# Patient Record
Sex: FEMALE | Race: ASIAN | Employment: STUDENT | URBAN - METROPOLITAN AREA
[De-identification: names, ages, dates, MRNs, and addresses within clinical notes are randomized per-mention and may not be internally consistent; named-entity substitution may affect disease eponyms.]

---

## 2018-07-13 ENCOUNTER — HOSPITAL ENCOUNTER (EMERGENCY)
Facility: CLINIC | Age: 18
Discharge: OTHER INSTITUTION | End: 2018-07-14
Attending: EMERGENCY MEDICINE | Admitting: EMERGENCY MEDICINE
Payer: COMMERCIAL

## 2018-07-13 ENCOUNTER — TRANSFERRED RECORDS (OUTPATIENT)
Dept: HEALTH INFORMATION MANAGEMENT | Facility: CLINIC | Age: 18
End: 2018-07-13

## 2018-07-13 ENCOUNTER — MEDICAL CORRESPONDENCE (OUTPATIENT)
Dept: HEALTH INFORMATION MANAGEMENT | Facility: CLINIC | Age: 18
End: 2018-07-13

## 2018-07-13 DIAGNOSIS — R45.851 SUICIDAL IDEATION: ICD-10-CM

## 2018-07-13 DIAGNOSIS — F12.90 MARIJUANA USE: ICD-10-CM

## 2018-07-13 LAB
AMPHETAMINES UR QL SCN: NEGATIVE
BARBITURATES UR QL: NEGATIVE
BENZODIAZ UR QL: NEGATIVE
CANNABINOIDS UR QL SCN: POSITIVE
COCAINE UR QL: NEGATIVE
OPIATES UR QL SCN: NEGATIVE
PCP UR QL SCN: NEGATIVE

## 2018-07-13 PROCEDURE — 99285 EMERGENCY DEPT VISIT HI MDM: CPT | Mod: 25

## 2018-07-13 PROCEDURE — 90791 PSYCH DIAGNOSTIC EVALUATION: CPT

## 2018-07-13 PROCEDURE — 80307 DRUG TEST PRSMV CHEM ANLYZR: CPT | Performed by: EMERGENCY MEDICINE

## 2018-07-13 PROCEDURE — 99284 EMERGENCY DEPT VISIT MOD MDM: CPT | Mod: 25

## 2018-07-13 PROCEDURE — 25000132 ZZH RX MED GY IP 250 OP 250 PS 637: Performed by: EMERGENCY MEDICINE

## 2018-07-13 RX ORDER — LORAZEPAM 0.5 MG/1
0.5 TABLET ORAL ONCE
Status: COMPLETED | OUTPATIENT
Start: 2018-07-13 | End: 2018-07-13

## 2018-07-13 RX ADMIN — LORAZEPAM 0.5 MG: 0.5 TABLET ORAL at 19:29

## 2018-07-13 NOTE — ED NOTES
Bed: [facility]  Expected date:   Expected time:   Means of arrival:   Comments:  brooke - Andre  Psych eval. Eta [id number]

## 2018-07-13 NOTE — ED PROVIDER NOTES
History     Chief Complaint:  Suicidal    HPI   Chanda Gongora is a 17 year old female, with a history of depression and bipolar disorder, who presents to the ED for evaluation of suicidal. Per nurse's report, the patient saw her therapist today and made suicidal comments. She has been scratching her arms to the point of bleeding. The patient stated she had been suicidal since 2-3 days ago, but then stated later she did not mean it. Upon evaluation, the patient reports she drover herself to see her regular therapist today. They were discussing about her hospitalization x2 within the past 6 months. They also discussed about her bipolar diagnosis and her current benedicto. Her therapist subsequently called police to transport her to the ED. The patient notes she has not been eating, drinking, or sleeping over last week. She has been hiding herself in her room to avoid her parents who does not know about her current mental health. The patient reports she stopped taking her Lamictal, Wellbutrin, Celexa, and Ritalin last week because she felt like it. She did smoke marijuana yesterday and today with her last alcohol intake 2 nights ago. She last smoked marijuana 4-5 hours ago. She has not used cocaine in 3 months since her last hospitalization. The patient denies making suicidal comments to her therapist or current suicidal ideation. She has not had recent medication changes. The patient notes she would like to go home.      Allergies:  No known drug allergies    Medications:    Celexa  Wellbutrin  Lipitor  Lamictal   Ritalin     Past Medical History:    ADHD  HLD  Depression  Bipolar disorder     Past Surgical History:    Cosmetic surgery    Family History:    History reviewed. No pertinent family history.     Social History:  Smoking status: No  Alcohol use: Yes  Presents to ED alone    Marital Status: Single [1]     Review of Systems   Unable to perform ROS: Psychiatric disorder     Physical Exam     Patient Vitals for  "the past 24 hrs:   BP Temp Temp src Pulse Heart Rate Resp SpO2 Height Weight   07/13/18 1643 122/65 98.5  F (36.9  C) Oral - 117 20 99 % - -   07/13/18 1638 122/65 - - - 117 - 100 % - -   07/13/18 1457 125/75 98.7  F (37.1  C) Oral 147 - 20 100 % 1.6 m (5' 3\") 47.6 kg (105 lb)     Physical Exam  General: Resting comfortably on the gurney  Eyes:  The pupils are equal and round    Conjunctivae and sclerae are normal  ENT:    Moist mucous membranes  Neck:  Normal range of motion  CV:  Tachycardic rate and rhythm    Skin warm and well perfused   Resp:  Lungs are clear    Non-labored    No rales    No wheezing  MS:  Normal muscular tone  Skin:  Superficial abrasions on arms  Neuro:   Awake, alert.      Speech is normal and fluent.    Face is symmetric.     Moves all extremities equally  Psych: Decreased eye contact, appears anxious.  Appropriate interactions.    Emergency Department Course     Laboratory:  UDS: Cannabinoids positive, o/w Negative     Emergency Department Course:  Patient arrived by EMS.     Past medical records, nursing notes, and vitals reviewed.  1541: I performed an exam of the patient and obtained history, as documented above.    1551: I spoke with Anthony BROOKE.     Patient provided a urine sample.    1635: I spoke with Anthony after his evaluation of the patient in the ED.     1751: I rechecked the patient. Explained plan to patient.    Findings and plan explained to the Patient. Patient will be transferred to [facility] via EMS. Dr. Leigh will admit the patient to a monitored bed for further monitoring, evaluation, and treatment.     Impression & Plan      Medical Decision Making:  Chanda Gongora is a 17 year old female presented emergency department with suicidal thoughts.  Patient saw her therapist today who transferred her to the emergency department.  She apparently needed to be placed in soft restraints on transfer here because of her agitation.  She was calm and cooperative here in the emergency " department.  Patient has been off her medications for the last week though does not give a good reason why.  She is denying any suicidal thoughts here in the emergency department but apparently had made comments to her therapist about suicidal thoughts.  Her therapist was concerned enough to send her to the emergency department.  Patient will not let us talk to her parents .  Unable to facilitate safe discharge plan given she will not let us talk to parents, she has been off medications for the last week, superficial cutting. Placed on hold and will be transferred to [facility].     Diagnosis:    ICD-10-CM   1. Suicidal ideation R45.851   2. Marijuana use F12.90     Disposition: Patient transferred to [facility] via EMS to be admitted to bed by Dr. Leigh.     Veronica Seo  7/13/2018    EMERGENCY DEPARTMENT    Scribe Disclosure:  I, Veronica Seo, am serving as a scribe at 3:41 PM on 7/13/2018 to document services personally performed by Linda Stephenson MD based on my observations and the provider's statements to me.          Linda Stephenson MD  07/13/18 [id number]

## 2018-07-14 ENCOUNTER — HOSPITAL ENCOUNTER (INPATIENT)
Facility: CLINIC | Age: 18
LOS: 3 days | Discharge: HOME | DRG: 881 | End: 2018-07-17
Attending: PSYCHIATRY & NEUROLOGY | Admitting: PSYCHIATRY & NEUROLOGY
Payer: COMMERCIAL

## 2018-07-14 VITALS
HEIGHT: 63 IN | SYSTOLIC BLOOD PRESSURE: 126 MMHG | RESPIRATION RATE: 14 BRPM | DIASTOLIC BLOOD PRESSURE: 84 MMHG | BODY MASS INDEX: 18.61 KG/M2 | TEMPERATURE: 98.5 F | OXYGEN SATURATION: 100 % | HEART RATE: 116 BPM | WEIGHT: 105 LBS

## 2018-07-14 DIAGNOSIS — F32.9 REACTIVE DEPRESSION: Primary | ICD-10-CM

## 2018-07-14 DIAGNOSIS — F90.8 ATTENTION DEFICIT HYPERACTIVITY DISORDER (ADHD), OTHER TYPE: ICD-10-CM

## 2018-07-14 PROBLEM — R45.851 SUICIDAL IDEATION: Status: ACTIVE | Noted: 2018-07-14

## 2018-07-14 PROCEDURE — 99207 ZZC CDG-MDM COMPONENT: MEETS MODERATE - DOWN CODED: CPT | Performed by: PSYCHIATRY & NEUROLOGY

## 2018-07-14 PROCEDURE — 99222 1ST HOSP IP/OBS MODERATE 55: CPT | Mod: AI | Performed by: PSYCHIATRY & NEUROLOGY

## 2018-07-14 PROCEDURE — 12400007 ZZH R&B MH INTERMEDIATE UMMC

## 2018-07-14 PROCEDURE — 25000132 ZZH RX MED GY IP 250 OP 250 PS 637: Performed by: PSYCHIATRY & NEUROLOGY

## 2018-07-14 RX ORDER — TRAZODONE HYDROCHLORIDE 50 MG/1
50 TABLET, FILM COATED ORAL
Status: DISCONTINUED | OUTPATIENT
Start: 2018-07-14 | End: 2018-07-17 | Stop reason: HOSPADM

## 2018-07-14 RX ORDER — HYDROXYZINE HYDROCHLORIDE 25 MG/1
25 TABLET, FILM COATED ORAL EVERY 4 HOURS PRN
Status: DISCONTINUED | OUTPATIENT
Start: 2018-07-14 | End: 2018-07-14

## 2018-07-14 RX ORDER — ALUMINA, MAGNESIA, AND SIMETHICONE 2400; 2400; 240 MG/30ML; MG/30ML; MG/30ML
30 SUSPENSION ORAL EVERY 4 HOURS PRN
Status: DISCONTINUED | OUTPATIENT
Start: 2018-07-14 | End: 2018-07-17 | Stop reason: HOSPADM

## 2018-07-14 RX ORDER — LAMOTRIGINE 25 MG/1
25 TABLET ORAL DAILY
Status: DISCONTINUED | OUTPATIENT
Start: 2018-07-14 | End: 2018-07-17 | Stop reason: HOSPADM

## 2018-07-14 RX ORDER — HYDROXYZINE HYDROCHLORIDE 25 MG/1
25 TABLET, FILM COATED ORAL 2 TIMES DAILY PRN
Status: DISCONTINUED | OUTPATIENT
Start: 2018-07-14 | End: 2018-07-17 | Stop reason: HOSPADM

## 2018-07-14 RX ORDER — ACETAMINOPHEN 325 MG/1
650 TABLET ORAL EVERY 4 HOURS PRN
Status: DISCONTINUED | OUTPATIENT
Start: 2018-07-14 | End: 2018-07-17 | Stop reason: HOSPADM

## 2018-07-14 RX ORDER — CITALOPRAM HYDROBROMIDE 10 MG/1
10 TABLET ORAL DAILY
Status: DISCONTINUED | OUTPATIENT
Start: 2018-07-14 | End: 2018-07-16

## 2018-07-14 RX ORDER — BISACODYL 10 MG
10 SUPPOSITORY, RECTAL RECTAL DAILY PRN
Status: DISCONTINUED | OUTPATIENT
Start: 2018-07-14 | End: 2018-07-17 | Stop reason: HOSPADM

## 2018-07-14 RX ADMIN — LAMOTRIGINE 25 MG: 25 TABLET ORAL at 16:42

## 2018-07-14 RX ADMIN — CITALOPRAM HYDROBROMIDE 10 MG: 10 TABLET ORAL at 16:42

## 2018-07-14 RX ADMIN — TRAZODONE HYDROCHLORIDE 50 MG: 50 TABLET ORAL at 21:45

## 2018-07-14 ASSESSMENT — ACTIVITIES OF DAILY LIVING (ADL)
ORAL_HYGIENE: INDEPENDENT
DRESS: INDEPENDENT
DRESS: INDEPENDENT
GROOMING: INDEPENDENT
ORAL_HYGIENE: INDEPENDENT
GROOMING: INDEPENDENT

## 2018-07-14 NOTE — PLAN OF CARE
"Problem: Mood Impairment (Depressive Signs/Symptoms) (Adult)  Goal: Improved Mood Symptoms (Depressive Signs/Symptoms)    Pt admitted to station 4A on a 72-hour hold from [facility] ED for suicidal ideation and reported benedicto. Hold paperwork is in pt's chart. Per report, pt has a history of depression, bipolar disorder, and ADHD. Pt reportedly went to see her therapist yesterday and told her therapist she is manic, scratching herself, and that she wanted to die. Pt reported that she has not been eating, drinking, or sleeping well. Pt's therapist called 911 and pt was transported to [facility] ED. Pt reportedly became combative while being transported and was placed in restraints. Per nurse at [facility], pt had been tachycardic and was given Ativan 0.5 mg PO in the ED. Upon arrival to the unit, pt was searched by two female staff. Pt was cooperative with the search, vitals, and the admission interview. When asked why she is here, pt stated, \"My therapist called the police because I was very very out of control and manic when I saw her.\" This is pt's 3rd inpatient mental health hospitalization. Pt has been hospitalized twice in the last six months. The most recent hospitalization was at [facility] in [place] in April 2018. Pt denied any current stressors. Pt denied any history of suicide attempts. Pt does see a therapist and psychiatrist. Pt has a history of SIB via cutting and burning. Pt reported she last engaged in SIB 3 weeks ago, however, intake note indicates pt scratched herself yesterday. Pt reported she has not taken her medications in about a week because she wanted to see how she would feel without them. On-call provider did not order pt's PTA medications, aside from Lipitor. Pt does not appear to be manic to writer, although reports she hasn't slept in 3 days due to benedicto. Pt denied any history of abuse or abusing others. Reported occasional alcohol and marijuana use. Pt's Utox was positive for marijuana. HCG " "was not done in the ED. Pt reported a history of attempted elopement during a previous hospitalization, but stated, \"I was just messing around, but they took it too seriously.\" Pt declined to sign an RAO for anyone at admission. Denied current SI/SIB/HI and contracted for safety. Pt did disclose that she does not like male nurses or psych associates. Will request that pt have female staff only. Status 15 and suicide/SIB/elopement precautions initiated.       "

## 2018-07-14 NOTE — ED NOTES
Report received. Care of patient assumed. Patient lying on cart quietly. Respirations are regular and unlabored. Sitter at bedside. Continue to monitor.

## 2018-07-14 NOTE — PROGRESS NOTES
07/14/18 0114   Patient Belongings   Did you bring any home meds/supplements to the hospital?  No   Patient Belongings cell phone/electronics;clothing;keys   Disposition of Belongings Locker;Sent to security per site process   Belongings Search Yes   Clothing Search Yes  (Yuliet STERLING did clothing search)   Second Staff Yuliet STERLING   Items sent to security in envelope #[id number]: [facility] ID, Visa debit card (estee-dye in color) ending in [phone], [facility] Mastercard debit card ending in [phone], [employer] health insurance card, [place] drivers license    Items in patient's storage bin: red wallet containing receipts, [employer] card, & [employer] card. Cell phone, keys & lanyard, black sports bra, blue t-shirt, maroon shorts w/ strings, black flip flop sandals    Items Added 7/14/18: Face cleanser and cream, leave in conditioner, brush, tweezers, head band, hair ties, 5 pr socks, sweatpants with strings, 9 pr underwear,  4 sports bras, glasses with case, 6 t-shirts, 1 long sleeved shirt, 1 blanket, 1 duffle bag.     Items added 4/15/18: 2 pr shorts with strings, pants with strings, green reusable grocery bag  A               Admission:  I am responsible for any personal items that are not sent to the safe or pharmacy.  [facility] is not responsible for loss, theft or damage of any property in my possession.    Signature:  _________________________________ Date: _______  Time: _____                                              Staff Signature:  ____________________________ Date: ________  Time: _____      2nd Staff person, if patient is unable/unwilling to sign:    Signature: ________________________________ Date: ________  Time: _____     Discharge:  [facility] has returned all of my personal belongings:    Signature: _________________________________ Date: ________  Time: _____                                          Staff Signature:  ____________________________ Date: ________  Time: _____

## 2018-07-14 NOTE — PROGRESS NOTES
"Pt visible in milieu and converses with peers. Pt cooperative with staff requests, but does not phrase her requests as a question, but as a demand. Pt approached staff and said, \"Clothes.\" When asked to clarify, she did ask as a question. Pt attending groups. Pt appears comfortable on the unit.     Pt refuses to check in. Pt refuses to answer questions regarding suicidal ideation. Pt states, \"I'm fine\" and walked away.      07/14/18 1400   Behavioral Health   Hallucinations denies / not responding to hallucinations   Thinking intact   Orientation person: oriented;place: oriented;date: oriented;time: oriented   Memory baseline memory   Judgement impaired   Eye Contact at examiner   Affect full range affect   Mood anxious   Physical Appearance/Attire attire appropriate to age and situation   Hygiene well groomed   Elopement (no concerning behaviors)   Activity other (see comment)  (visible and social in milieu)   Speech coherent;clear   Medication Sensitivity no observed side effects;no stated side effects   Psychomotor / Gait balanced;steady   Safety   Suicidality Status 15   Psycho Education   Type of Intervention 1:1 intervention   Response participates with encouragement   Hours 0.5   Activities of Daily Living   Hygiene/Grooming independent   Oral Hygiene independent   Dress independent   Room Organization independent   Activity   Activity Assistance Provided independent     "

## 2018-07-14 NOTE — PROGRESS NOTES
"Initial Psychosocial Assessment    I have reviewed the chart, met with the patient, and developed Care Plan.      Presenting Problem:  Per patient: Patient was in therapy and said that her therapist called the police, when asked why she replied, \"you'll have to ask her I don't really remember.\" Patient reports she has been experiencing \"benedicto mostly\" fairly frequently the last couple months. Patient reports she has been diagnosed with BESSIE, MDD, ADHD, Bipolar disorder.    Patient brought to ED via EMS after disclosing to her therapist that she was suicidal. Patient also exhibiting erratic behaviors and has been off medications.Chanda Gongora is a 17 year old female, with a history of depression and bipolar disorder, who presents to the ED for evaluation of suicidal. Per nurse's report, the patient saw her therapist today and made suicidal comments. She has been scratching her arms to the point of bleeding. The patient stated she had been suicidal since 2-3 days ago, but then stated later she did not mean it. Upon evaluation, the patient reports she drover herself to see her regular therapist today. They were discussing about her hospitalization x2 within the past 6 months. They also discussed about her bipolar diagnosis and her current benedicto. Her therapist subsequently called police to transport her to the ED. The patient notes she has not been eating, drinking, or sleeping over last week. She has been hiding herself in her room to avoid her parents who does not know about her current mental health. The patient reports she stopped taking her Lamictal, Wellbutrin, Celexa, and Ritalin last week because she felt like it. She did smoke marijuana yesterday and today with her last alcohol intake 2 nights ago. She last smoked marijuana 4-5 hours ago. She has not used cocaine in 3 months since her last hospitalization. The patient denies making suicidal comments to her therapist or current suicidal ideation. She has not had " recent medication changes. The patient notes she would like to go home    History of Mental Health and Chemical Dependency:  Mental health history: Patient has been hospitalized twice this year, [facility] and again in March at [facility]    Chemical use history: Consistent daily cannabis use for last several years. Utox positive for cannbinoids.    Family Description (Constellation, Family Psychiatric History):  Patient was adopted from [place] and has been trying to locate her birth mother. Patient grew up in [place] and was raised by adoptive parents. She has a close relationship with her parents and brother and finds them to be very supportive. She reports her brother was also adopted by their parents.  She has no knowledge of her biological family.    Significant Life Events (Illness, Abuse, Trauma, Death):  Patient endorses bullying in school    Living Situation:  Patient resides with her parents    Educational Background:  Was a freshman in college, dropped out after first hospitalization. Plans to resume college once stable.    Occupational History:  Patient is unemployed    Financial Status:  Supported by [facility]    Legal Issues:  Patient denies     Ethnic/Cultural Issues:   / English speaking    Spiritual Orientation:  None identified     Service History:  Denies     Current Treatment Providers are:  Psychiatry: Dr. Milka Zavaleta  Therapist: Feliberto Rodriguez PsyD LP  PCP: Dr. Carolina Be at [facility] in [place]    Social Functioning:  Patient reports that she likes to hang out by herself. She reports she hangs out with her dog and sometimes her best friend. She reports she has a drivers license and a car. Reports that she watches television during the day. She enjoys listening and playing music, she reports that she plays guitar. She likes watching movies and going on boat rides.    Social Service Assessment/Plan:  Patient has been admitted for psychiatric stabilization. Patient will have  psychiatric assessment and medication management by the psychiatrist. Medications will be reviewed and adjusted per MD as indicated. The treatment team will continue to assess and stabilize the patient's mental health symptoms with the use of medications and therapeutic programming. Hospital staff will provide a safe environment and a therapeutic milieu. Staff will continue to assess patient as needed. Patient will participate in unit groups and activities. Patient will receive individual and group support on the unit.  CTC will do individual inpatient treatment planning and after care planning. CTC will discuss options for increasing community supports with the patient. CTC will coordinate with outpatient providers and will place referrals to ensure appropriate follow up care is in place.  Patient would benefit from: Medication management, patient would benefit from PHP or day treatment programming which was recommended following her last MH hospitalization.

## 2018-07-14 NOTE — PROGRESS NOTES
"CLINICAL NUTRITION SERVICES - ASSESSMENT NOTE     Nutrition Prescription    RECOMMENDATIONS FOR MDs/PROVIDERS TO ORDER:  None    Malnutrition Status:    Patient does not meet criteria    Recommendations already ordered by Registered Dietitian (RD):  None    Future/Additional Recommendations:  If po intake remains poor, consider addition of oral nutrition supplement.     REASON FOR ASSESSMENT  Chanda Gongora is a/an 17 year old female assessed by the dietitian for Admission Nutrition Risk Screen for reduced oral intake over the last month    NUTRITION HISTORY  Patient reports she has not eaten anything in 2 days. Intake has been reduced for some time; pt states she will have a couple weeks where she won't eat and then a couple weeks where she will eat.    CURRENT NUTRITION ORDERS  Diet: Regular  Intake/Tolerance: Patient admitted to the unit late last night. She has not had anything to eat yet on the unit.    LABS  Labs reviewed    MEDICATIONS  Medications reviewed    ANTHROPOMETRICS  Height: 160 cm (5' 3\")  Most Recent Weight: 49 kg (108 lb 0.4 oz)    IBW: 52.2 kg  BMI: Normal BMI  Weight History: Patient reports usual body weight of 107-110 lbs.  Wt Readings from Last 5 Encounters:   07/13/18 49 kg (108 lb 0.4 oz) (12 %)*   07/13/18 47.6 kg (105 lb) (8 %)*     * Growth percentiles are based on CDC 2-20 Years data.       Dosing Weight: 49 kg    ASSESSED NUTRITION NEEDS  Estimated Energy Needs: 0203-8335 kcals/day (35 - 38 kcals/kg)  Justification: Maintenance/ RDA in young adult female  Estimated Protein Needs: 39-49 grams protein/day (0.8 - 1 grams of pro/kg)  Justification: Maintenance  Estimated Fluid Needs: 1 mL/kcal or per MD goals   Justification: Maintenance    PHYSICAL FINDINGS  See malnutrition section below.  No abnormal nutrition-related physical findings observed.     MALNUTRITION  % Intake: </= 50% for >/= 5 days (severe)  % Weight Loss: None noted  Subcutaneous Fat Loss: None observed  Muscle Loss: " None observed  Fluid Accumulation/Edema: None noted  Malnutrition Diagnosis: Patient does not meet two of the above criteria necessary for diagnosing malnutrition    NUTRITION DIAGNOSIS  Inadequate oral intake related to decreased appetite, altered mental status as evidenced by limited po intake both prior to and since admission.      INTERVENTIONS  Implementation  Nutrition Education: encouraged pt to make efforts to consume regular meals and take in adequate fluids.   Collaboration with other providers     Goals  Patient to consume % of nutritionally adequate meal trays TID, or the equivalent with supplements/snacks.     Monitoring/Evaluation  Progress toward goals will be monitored and evaluated per protocol.      Aaliyah Grimes, MS, RD, LD
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
## 2018-07-14 NOTE — IP AVS SNAPSHOT
MRN:[id number]                      After Visit Summary   7/14/2018    Chanda Gongora    MRN: [id number]           Patient Information     Date Of Birth          2000        Designated Caregiver       Most Recent Value    Caregiver    Will someone help with your care after discharge? yes    Name of designated caregiver Khalida Gongora (mother)    Phone number of caregiver pt does not know    Caregiver address same as pt      About your hospital stay     You were admitted on:  July 14, 2018 You last received care in the:  [facility]    You were discharged on:  July 17, 2018       Who to Call     For medical emergencies, please call 911.  For non-urgent questions about your medical care, please call your primary care provider or clinic, [phone]          Attending Provider     Provider Specialty    Jared Leigh MD Psychiatry       Primary Care Provider Office Phone # Fax #    Milka Zavaleta [phone]      Additional Services     Medication Therapy Management Referral       MTM referral reason            antidepressants: 3 or more prescribed     This service is designed to help you get the most from your medications.  A specially trained pharmacist will work closely with you and your doctors  to solve any problems related to your medications and to help you get the   best results from taking them.      The Medication Therapy Management staff will call you to schedule an appointment.                  Further instructions from your care team       Behavioral Discharge Planning and Instructions      Summary: You were admitted on 7/14/2018 to [address] due to suicidal ideation.  You were treated by Dr. Nelida Viera and Dr. Jared Leigh and discharged on 7/17/18  to Home    Principal Diagnosis: Depressive disorder, unspecified    Health Care Follow-up Appointments:   Medication Management  Date: Thursday, July 19, 2018  Time: 2:30pm     "  Provider: Dr. Milka Zavaleta  Address: [address] [place]  [address]  Phone:[phone]   [facility] has faxed the Discharge Summary and AVS to this provider at Fax: [phone]      Dual Disorder DBT Intake Appointment   Date: Monday, August 6, 2018  Time: 2pm      Provider: Kassandra Baugh  Address: [employer] [facility]. [address] [place] [address], [address] [place] [address]  Phone: [phone]  [facility] has faxed the Discharge Summary and AVS to this provider at  Fax: [phone]     Attend all scheduled appointments with your outpatient providers. Call at least 24 hours in advance if you need to reschedule an appointment to ensure continued access to your outpatient providers.   Major Treatments, Procedures and Findings: You were provided with: a psychiatric assessment, assessed for medical stability, medication evaluation and/or management, group therapy and milieu management    Symptoms to Report: feeling more aggressive, increased confusion, losing more sleep, mood getting worse or thoughts of suicide    Early warning signs can include:  increased depression or anxiety sleep disturbances increased thoughts or behaviors of suicide or self-harm     Safety and Wellness:  Take all medicines as directed.  Make no changes unless your doctor suggests them.      Follow treatment recommendations.  Refrain from alcohol and non-prescribed drugs.  If there is a concern for safety, call [phone].    Resources: Mental health crisis response for your [facility] is offered 24 hours a day, 7 days a week. A trained counselor will assess your current situation, offer support and counseling and connect you with local resources. Please call  [facility] Crisis (COPE) Response - Adult [phone]    Text 4 Life: txt \"LIFE\" to 02382 for immediate support and crisis intervention  Crisis text line: Text \"[place]\" to 894079. Free, confidential, 24/7.    The treatment team has appreciated the opportunity to work with you. Chanda, please " "take care and make your recovery a daily recovery. If you have any questions or concerns our unit number is [phone].  You will be receiving a follow-up phone call within the next three days from a representative from behavioral health.  You have identified the best phone number to reach you as [phone] (home)               Pending Results     No orders found from 2018 to 7/15/2018.            Admission Information     Date & Time Provider Department Dept. Phone    2018 Jared Leigh MD [facility] [phone]      Your Vitals Were     Blood Pressure Pulse Temperature Respirations Height Weight    102/61 (BP Location: Right arm) 87 96.7  F (35.9  C) (Tympanic) 16 1.6 m (5' 3\") 51.3 kg (113 lb 1.5 oz)    Pulse Oximetry BMI (Body Mass Index)                95% 20.03 kg/m2          [employer] Information     [employer] lets you send messages to your doctor, view your test results, renew your prescriptions, schedule appointments and more. To sign up, go to [email or web address] . Click on \"Log in\" on the left side of the screen, which will take you to the Welcome page. Then click on \"Sign up Now\" on the right side of the page.     You will be asked to enter the access code listed below, as well as some personal information. Please follow the directions to create your username and password.     Your access code is: [email or web address]  Expires: 10/15/2018 10:42 AM     Your access code will  in 90 days. If you need help or a new code, please call your [facility] clinic or [phone].        Care EveryWhere ID     This is your Care EveryWhere ID. This could be used by other organizations to access your [facility] medical records  [id number]        Equal Access to Services     [facility] TATIANA : Althea Villalta, farhan zuniga, chuck sabillon, mariah hughes. So [facility] [phone].    ATENCIÓN: Si habla español, tiene a milton disposición " servicios gratuitos de asistencia lingüística. Booker childers [phone].    We comply with applicable federal civil rights laws and [place] laws. We do not discriminate on the basis of race, color, national origin, age, disability, sex, sexual orientation, or gender identity.               Review of your medicines      CONTINUE these medicines which may have CHANGED, or have new prescriptions. If we are uncertain of the size of tablets/capsules you have at home, strength may be listed as something that might have changed.        Dose / Directions    buPROPion 150 MG 24 hr tablet   Commonly known as:  WELLBUTRIN XL   This may have changed:  how much to take   Used for:  Reactive depression        Dose:  150 mg   Take 1 tablet (150 mg) by mouth daily   Quantity:  30 tablet   Refills:  0       citalopram 20 MG tablet   Commonly known as:  celeXA   This may have changed:    - medication strength  - how much to take   Used for:  Reactive depression        Dose:  20 mg   Take 1 tablet (20 mg) by mouth daily   Quantity:  30 tablet   Refills:  0       lamoTRIgine 25 MG tablet   Commonly known as:  LaMICtal   This may have changed:    - medication strength  - how much to take   Used for:  Reactive depression        Dose:  25 mg   Take 1 tablet (25 mg) by mouth daily   Quantity:  39 tablet   Refills:  0       methylphenidate 10 MG tablet   Commonly known as:  RITALIN   This may have changed:    - medication strength  - when to take this   Used for:  Attention deficit hyperactivity disorder (ADHD), other type        Dose:  10 mg   Take 1 tablet (10 mg) by mouth daily   Quantity:  30 tablet   Refills:  0         CONTINUE these medicines which have NOT CHANGED        Dose / Directions    etonogestrel 68 MG Impl   Commonly known as:  IMPLANON/NEXPLANON   Indication:  placed summer of 2017        Dose:  1 each   1 each by Subdermal route once   Refills:  0       LIPITOR PO   Indication:  High Amount of Triglycerides in the Blood         Dose:  25 mg   Take 25 mg by mouth daily   Refills:  0            Where to get your medicines      These medications were sent to [facility] [place], [place] - [address]  [address], [facility] [address]     Phone:  [phone]     buPROPion 150 MG 24 hr tablet    citalopram 20 MG tablet    lamoTRIgine 25 MG tablet         Some of these will need a paper prescription and others can be bought over the counter. Ask your nurse if you have questions.     Bring a paper prescription for each of these medications     methylphenidate 10 MG tablet                Protect others around you: Learn how to safely use, store and throw away your medicines at www.disposemymeds.org.             Medication List: This is a list of all your medications and when to take them. Check marks below indicate your daily home schedule. Keep this list as a reference.      Medications           Morning Afternoon Evening Bedtime As Needed    buPROPion 150 MG 24 hr tablet   Commonly known as:  WELLBUTRIN XL   Take 1 tablet (150 mg) by mouth daily   Last time this was given:  150 mg on 7/17/2018  9:59 AM                                citalopram 20 MG tablet   Commonly known as:  celeXA   Take 1 tablet (20 mg) by mouth daily   Last time this was given:  20 mg on 7/17/2018  9:59 AM                                etonogestrel 68 MG Impl   Commonly known as:  IMPLANON/NEXPLANON   1 each by Subdermal route once                                lamoTRIgine 25 MG tablet   Commonly known as:  LaMICtal   Take 1 tablet (25 mg) by mouth daily   Last time this was given:  25 mg on 7/17/2018  9:58 AM                                LIPITOR PO   Take 25 mg by mouth daily   Last time this was given:  25 mg on 7/17/2018  9:59 AM                                methylphenidate 10 MG tablet   Commonly known as:  RITALIN   Take 1 tablet (10 mg) by mouth daily   Last time this was given:  10 mg on 7/17/2018  9:58 AM

## 2018-07-14 NOTE — IP AVS SNAPSHOT
[facility]    [facility] [address]    [address] [address]    Phone:  [phone]                                       After Visit Summary   7/14/2018    Chanda Gongora    MRN: [id number]           After Visit Summary Signature Page     I have received my discharge instructions, and my questions have been answered. I have discussed any challenges I see with this plan with the nurse or doctor.    ..........................................................................................................................................  Patient/Patient Representative Signature      ..........................................................................................................................................  Patient Representative Print Name and Relationship to Patient    ..................................................               ................................................  Date                                            Time    ..........................................................................................................................................  Reviewed by Signature/Title    ...................................................              ..............................................  Date                                                            Time

## 2018-07-15 LAB
ALBUMIN SERPL-MCNC: 3.8 G/DL (ref 3.4–5)
ALP SERPL-CCNC: 58 U/L (ref 40–150)
ALT SERPL W P-5'-P-CCNC: 27 U/L (ref 0–50)
ANION GAP SERPL CALCULATED.3IONS-SCNC: 5 MMOL/L (ref 3–14)
AST SERPL W P-5'-P-CCNC: 13 U/L (ref 0–35)
BASOPHILS # BLD AUTO: 0 10E9/L (ref 0–0.2)
BASOPHILS NFR BLD AUTO: 0.3 %
BILIRUB SERPL-MCNC: 0.8 MG/DL (ref 0.2–1.3)
BUN SERPL-MCNC: 11 MG/DL (ref 7–30)
CALCIUM SERPL-MCNC: 8.5 MG/DL (ref 8.5–10.1)
CHLORIDE SERPL-SCNC: 108 MMOL/L (ref 96–110)
CHOLEST SERPL-MCNC: 201 MG/DL
CO2 SERPL-SCNC: 30 MMOL/L (ref 20–32)
CREAT SERPL-MCNC: 0.72 MG/DL (ref 0.5–1)
DIFFERENTIAL METHOD BLD: NORMAL
EOSINOPHIL # BLD AUTO: 0.1 10E9/L (ref 0–0.7)
EOSINOPHIL NFR BLD AUTO: 1.7 %
ERYTHROCYTE [DISTWIDTH] IN BLOOD BY AUTOMATED COUNT: 12.2 % (ref 10–15)
GFR SERPL CREATININE-BSD FRML MDRD: >90 ML/MIN/1.7M2
GLUCOSE SERPL-MCNC: 89 MG/DL (ref 70–99)
HCT VFR BLD AUTO: 39.2 % (ref 35–47)
HDLC SERPL-MCNC: 49 MG/DL
HGB BLD-MCNC: 13.1 G/DL (ref 11.7–15.7)
IMM GRANULOCYTES # BLD: 0 10E9/L (ref 0–0.4)
IMM GRANULOCYTES NFR BLD: 0.3 %
LDLC SERPL CALC-MCNC: 137 MG/DL
LYMPHOCYTES # BLD AUTO: 2.1 10E9/L (ref 0.8–5.3)
LYMPHOCYTES NFR BLD AUTO: 28.4 %
MCH RBC QN AUTO: 31.9 PG (ref 26.5–33)
MCHC RBC AUTO-ENTMCNC: 33.4 G/DL (ref 31.5–36.5)
MCV RBC AUTO: 95 FL (ref 78–100)
MONOCYTES # BLD AUTO: 0.5 10E9/L (ref 0–1.3)
MONOCYTES NFR BLD AUTO: 6 %
NEUTROPHILS # BLD AUTO: 4.7 10E9/L (ref 1.6–8.3)
NEUTROPHILS NFR BLD AUTO: 63.3 %
NONHDLC SERPL-MCNC: 152 MG/DL
NRBC # BLD AUTO: 0 10*3/UL
NRBC BLD AUTO-RTO: 0 /100
PLATELET # BLD AUTO: 211 10E9/L (ref 150–450)
POTASSIUM SERPL-SCNC: 3.7 MMOL/L (ref 3.4–5.3)
PROT SERPL-MCNC: 7 G/DL (ref 6.8–8.8)
RBC # BLD AUTO: 4.11 10E12/L (ref 3.8–5.2)
SODIUM SERPL-SCNC: 143 MMOL/L (ref 133–144)
TRIGL SERPL-MCNC: 73 MG/DL
TSH SERPL DL<=0.005 MIU/L-ACNC: 0.99 MU/L (ref 0.4–4)
WBC # BLD AUTO: 7.5 10E9/L (ref 4–11)

## 2018-07-15 PROCEDURE — 80061 LIPID PANEL: CPT | Performed by: PSYCHIATRY & NEUROLOGY

## 2018-07-15 PROCEDURE — 80053 COMPREHEN METABOLIC PANEL: CPT | Performed by: PSYCHIATRY & NEUROLOGY

## 2018-07-15 PROCEDURE — 36415 COLL VENOUS BLD VENIPUNCTURE: CPT | Performed by: PSYCHIATRY & NEUROLOGY

## 2018-07-15 PROCEDURE — 85025 COMPLETE CBC W/AUTO DIFF WBC: CPT | Performed by: PSYCHIATRY & NEUROLOGY

## 2018-07-15 PROCEDURE — 12400007 ZZH R&B MH INTERMEDIATE UMMC

## 2018-07-15 PROCEDURE — 25000132 ZZH RX MED GY IP 250 OP 250 PS 637: Performed by: PSYCHIATRY & NEUROLOGY

## 2018-07-15 PROCEDURE — 84443 ASSAY THYROID STIM HORMONE: CPT | Performed by: PSYCHIATRY & NEUROLOGY

## 2018-07-15 RX ADMIN — LAMOTRIGINE 25 MG: 25 TABLET ORAL at 09:16

## 2018-07-15 RX ADMIN — CITALOPRAM HYDROBROMIDE 10 MG: 10 TABLET ORAL at 09:16

## 2018-07-15 RX ADMIN — Medication 25 MG: at 09:16

## 2018-07-15 RX ADMIN — TRAZODONE HYDROCHLORIDE 50 MG: 50 TABLET ORAL at 22:13

## 2018-07-15 ASSESSMENT — ACTIVITIES OF DAILY LIVING (ADL)
ORAL_HYGIENE: INDEPENDENT
GROOMING: INDEPENDENT
GROOMING: INDEPENDENT
DRESS: INDEPENDENT
ORAL_HYGIENE: INDEPENDENT
DRESS: INDEPENDENT

## 2018-07-15 NOTE — PROGRESS NOTES
07/14/18 2312   Significant Event   Significant Event Other (see comments)  (shift summary)     Pt was visible in the milieu, participated and attended groups.  Socialized with peers, and watched TV in the lounge.  Denies SI, SIB, hallucinations and all mental health symptoms.  Independent with her ADL's, and full range affect.

## 2018-07-15 NOTE — PROGRESS NOTES
07/14/18 2000   Art Therapy   Type of Intervention structured groups   Response participates with cues/redirection   Hours 3   Treatment Detail (Art Therapy)   Problem-Bipolar/ manic  Goal- Art Therapy for coping/ expression  Theme of angel today, SILVA talk and art/ mindfulness  Outcome- pt was cooperative and engaged.  Some of her answers in group, she would blurt out were unfiltered. She did ok in group and was getting to know peers. She made several references to mom, missing mom, calling mom.   During the codes on the unit when writer checked on her, she said she needed all her medications. She was feeling not good and said she was feeling nauseous and dizzy. Writer let her nurse know.

## 2018-07-15 NOTE — PROGRESS NOTES
Pt resting in room most of shift. Pt refused lunch and refused to check in with staff. Pt minimally in milieu. Pt showed flat affect when approached by staff. Pt refused to answer questions about suicidal thoughts.      07/15/18 1300   Behavioral Health   Hallucinations denies / not responding to hallucinations   Thinking distractable   Orientation person: oriented;place: oriented;date: oriented;time: oriented   Memory baseline memory   Insight poor   Judgement impaired   Eye Contact at examiner   Affect blunted, flat   Mood depressed   Physical Appearance/Attire attire appropriate to age and situation   Hygiene well groomed   Suicidality (refused check in)   1. Wish to be Dead (refused to answer)   2. Non-Specific Active Suicidal Thoughts  (refused to answer)   Elopement (no behavioral concerns)   Activity withdrawn;isolative   Speech coherent;clear   Medication Sensitivity no observed side effects;no stated side effects   Psychomotor / Gait balanced;steady   Safety   Suicidality Status 15   Psycho Education   Type of Intervention 1:1 intervention   Response participates with encouragement   Hours 0.5   Treatment Detail Check in   Activities of Daily Living   Hygiene/Grooming independent   Oral Hygiene independent   Dress independent   Room Organization independent   Activity   Activity Assistance Provided independent

## 2018-07-16 PROCEDURE — 99232 SBSQ HOSP IP/OBS MODERATE 35: CPT | Performed by: PSYCHIATRY & NEUROLOGY

## 2018-07-16 PROCEDURE — 99207 ZZC CDG-CODE CATEGORY CHANGED: CPT | Performed by: PSYCHIATRY & NEUROLOGY

## 2018-07-16 PROCEDURE — 12400007 ZZH R&B MH INTERMEDIATE UMMC

## 2018-07-16 PROCEDURE — 25000132 ZZH RX MED GY IP 250 OP 250 PS 637: Performed by: PSYCHIATRY & NEUROLOGY

## 2018-07-16 PROCEDURE — H2032 ACTIVITY THERAPY, PER 15 MIN: HCPCS

## 2018-07-16 RX ORDER — BUPROPION HYDROCHLORIDE 150 MG/1
150 TABLET ORAL DAILY
Status: DISCONTINUED | OUTPATIENT
Start: 2018-07-16 | End: 2018-07-17 | Stop reason: HOSPADM

## 2018-07-16 RX ORDER — METHYLPHENIDATE HYDROCHLORIDE 10 MG/1
10 TABLET ORAL DAILY
Status: DISCONTINUED | OUTPATIENT
Start: 2018-07-16 | End: 2018-07-17 | Stop reason: HOSPADM

## 2018-07-16 RX ORDER — CITALOPRAM HYDROBROMIDE 20 MG/1
20 TABLET ORAL DAILY
Status: DISCONTINUED | OUTPATIENT
Start: 2018-07-17 | End: 2018-07-17 | Stop reason: HOSPADM

## 2018-07-16 RX ADMIN — CITALOPRAM HYDROBROMIDE 10 MG: 10 TABLET ORAL at 09:25

## 2018-07-16 RX ADMIN — TRAZODONE HYDROCHLORIDE 50 MG: 50 TABLET ORAL at 22:24

## 2018-07-16 RX ADMIN — BUPROPION HYDROCHLORIDE 150 MG: 150 TABLET, FILM COATED, EXTENDED RELEASE ORAL at 11:58

## 2018-07-16 RX ADMIN — ACETAMINOPHEN 650 MG: 325 TABLET, FILM COATED ORAL at 13:00

## 2018-07-16 RX ADMIN — LAMOTRIGINE 25 MG: 25 TABLET ORAL at 09:26

## 2018-07-16 RX ADMIN — Medication 25 MG: at 09:26

## 2018-07-16 ASSESSMENT — ACTIVITIES OF DAILY LIVING (ADL): GROOMING: INDEPENDENT

## 2018-07-16 NOTE — PLAN OF CARE
Problem: General Plan of Care (Inpatient Behavioral)  Goal: Team Discussion  Team Plan:   Outcome: Improving  BEHAVIORAL TEAM DISCUSSION    Participants: 4A Provider: Dr. Jared Leigh MD; 4A RN's: Nelda Kahn RN; 4A CTC's: Adwoa Telles (CTC) and Alexandra Bernstein (CTC).  Progress: Improving.  Continued Stay Criteria/Rationale: SI  Medical/Physical: Deferred (see medical notes).  Precautions:    Behavioral Orders   Procedures     Code 1 - Restrict to Unit     Elopement precautions     Routine Programming     As clinically indicated     Self Injury Precaution     Status 15     Every 15 minutes.     Suicide precautions     Patients on Suicide Precautions should have a Combination Diet ordered that includes a Diet selection(s) AND a Behavioral Tray selection for Safe Tray - with utensils, or Safe Tray - NO utensils       Plan:  The following services will be provided to the patient; psychiatric assessment, medication management, therapeutic milieu, individual and group support, art therapy, and skills/OT groups.   Rationale for change in precautions or plan: new admit

## 2018-07-16 NOTE — PLAN OF CARE
"Problem: Overarching Goals (Adult)  Goal: Optimized Coping Skills in Response to Life Stressors  Outcome: Therapy, progress toward functional goals is gradual  Patient up and out for breakfast, morning routine.  Full range, social.  Took morning medications without incident.  States her admit is for suicidal ideation, what she describes as increasing manic behaviors in context of not taking medications.  During interview, bouncing a ball and bouncing her legs up and down.  Speech somewhat rapid.  Psych: When asked if suicidal currently she states \"not really\".  Denies SIB.  Reports \"a little\" depression.  Medical: offers no concerns.  Social: has no plans for further education (college). Does not have a job.  Lives at home with parents and offers no concerns regarding her living situation.  Denies concerns regarding sleep, appetite, medication side effects.  Now attending group.      "

## 2018-07-16 NOTE — PLAN OF CARE
"Problem: Sleep Impairment (Depressive Signs/Symptoms) (Adult)  Goal: Improved Sleep Hygiene (Depressive Signs/Symptoms)   07/15/18 1850   Improved Sleep Hygiene (Depressive Signs/Symptoms)   Improved Sleep Hygiene Time Frame for Action Step (STG) 2 days   Improved Sleep Hygiene Action Step (STG) Outcome unable to achieve outcome     RN48/     Patient is not hopeful stating \"I don't like what my doctor said.\" and stated GOAL is \"My doctor said she was not putting me back on my medication.\"  \"I think stopping the medication makes me nauseous.\"    Patient rates depression 7/10* c/o Significant change in appetite or weight.  Patient reports feeling nauseous. Declines intervention.   Change in sleep (too much).  Diminished concentration or indecisiveness  Patient rates anxiety at  6-7/10* c/o It comes and goes.  Patient describes mood as \"eh\". Patient stated, \"I think it is related to stopping my medications.\"    Patient is cooperative, pleasant and calm appearing full rangeand Appropriate/mood-congruent. Patient is med-compliant, is eating 50% and reports \"taking naps\". Patient is attending groups.   Patient denies current or recent suicidal ideation    SIB occasionally has thoughts.  Patient stated, \"I had them earlier but, I went to the kitchen and got something to eat.\"  HI: denies current or recent homicidal ideation or behaviors.    VS reviewed: /69  Pulse 91  Temp 97.7  F (36.5  C) (Oral)  Resp 16  Ht 1.6 m (5' 3\")  Wt 50.5 kg (111 lb 6.4 oz)  SpO2 100%  BMI 19.73 kg/m2 . Patient denies pain.    Patient evaluation continues. Assessed mood,anxiety,thoughts and behavior. Patient is not progressing towards goals. Patient is encouraged to participate in groups and assisted to develop healthy coping skills.     Length of stay: 1    Refer to daily team meeting notes for individualized plan of care. Nursing will continue to assess.    * Scale is offered as scale of 1 to 10 with 10 being the worst. "

## 2018-07-16 NOTE — DISCHARGE INSTRUCTIONS
Behavioral Discharge Planning and Instructions      Summary: You were admitted on 7/14/2018 to [facility] [address] [place] [address] due to suicidal ideation.  You were treated by Dr. Nelida Viera and Dr. Jared Leigh and discharged on 7/17/18  to Home    Principal Diagnosis: Depressive disorder, unspecified    Health Care Follow-up Appointments:   Medication Management  Date: Thursday, July 19, 2018  Time: 2:30pm      Provider: Dr. Milka Zavaleta  Address: [address] [place]  [address]  Phone:[phone]   [facility] has faxed the Discharge Summary and AVS to this provider at Fax: [phone]      Dual Disorder DBT Intake Appointment   Date: Monday, August 6, 2018  Time: 2pm      Provider: Kassandra Baugh  Address: [email or web address]. [address] [place] [address] [address] [place] [address]  Phone: [phone]  [facility] has faxed the Discharge Summary and AVS to this provider at  Fax: [phone]     Attend all scheduled appointments with your outpatient providers. Call at least 24 hours in advance if you need to reschedule an appointment to ensure continued access to your outpatient providers.   Major Treatments, Procedures and Findings: You were provided with: a psychiatric assessment, assessed for medical stability, medication evaluation and/or management, group therapy and milieu management    Symptoms to Report: feeling more aggressive, increased confusion, losing more sleep, mood getting worse or thoughts of suicide    Early warning signs can include:  increased depression or anxiety sleep disturbances increased thoughts or behaviors of suicide or self-harm     Safety and Wellness:  Take all medicines as directed.  Make no changes unless your doctor suggests them.      Follow treatment recommendations.  Refrain from alcohol and non-prescribed drugs.  If there is a concern for safety, call 911.    Resources: Mental health crisis response for your [facility] is offered 24 hours a day, 7 days a week. A trained counselor will  "assess your current situation, offer support and counseling and connect you with local resources. Please call  [facility] Crisis (COPE) Response - Adult [phone]    Text 4 Life: txt \"LIFE\" to 38636 for immediate support and crisis intervention  Crisis text line: Text \"[place]\" to 208278. Free, confidential, 24/7.    The treatment team has appreciated the opportunity to work with you. Chanda, please take care and make your recovery a daily recovery. If you have any questions or concerns our unit number is [phone].  You will be receiving a follow-up phone call within the next three days from a representative from behavioral health.  You have identified the best phone number to reach you as [phone] (home)             "

## 2018-07-17 VITALS
OXYGEN SATURATION: 95 % | DIASTOLIC BLOOD PRESSURE: 61 MMHG | TEMPERATURE: 96.7 F | HEIGHT: 63 IN | HEART RATE: 87 BPM | SYSTOLIC BLOOD PRESSURE: 102 MMHG | BODY MASS INDEX: 20.04 KG/M2 | RESPIRATION RATE: 16 BRPM | WEIGHT: 113.1 LBS

## 2018-07-17 PROCEDURE — 25000132 ZZH RX MED GY IP 250 OP 250 PS 637: Performed by: PSYCHIATRY & NEUROLOGY

## 2018-07-17 PROCEDURE — H2032 ACTIVITY THERAPY, PER 15 MIN: HCPCS

## 2018-07-17 PROCEDURE — 99238 HOSP IP/OBS DSCHRG MGMT 30/<: CPT | Performed by: PSYCHIATRY & NEUROLOGY

## 2018-07-17 RX ORDER — LAMOTRIGINE 25 MG/1
25 TABLET ORAL DAILY
Qty: 39 TABLET | Refills: 0 | Status: SHIPPED | OUTPATIENT
Start: 2018-07-17 | End: 2019-05-08

## 2018-07-17 RX ORDER — METHYLPHENIDATE HYDROCHLORIDE 10 MG/1
10 TABLET ORAL DAILY
Qty: 30 TABLET | Refills: 0 | Status: SHIPPED | OUTPATIENT
Start: 2018-07-17 | End: 2019-05-08

## 2018-07-17 RX ORDER — CITALOPRAM HYDROBROMIDE 20 MG/1
20 TABLET ORAL DAILY
Qty: 30 TABLET | Refills: 0 | Status: SHIPPED | OUTPATIENT
Start: 2018-07-17 | End: 2019-05-08

## 2018-07-17 RX ORDER — BUPROPION HYDROCHLORIDE 150 MG/1
150 TABLET ORAL DAILY
Qty: 30 TABLET | Refills: 0 | Status: SHIPPED | OUTPATIENT
Start: 2018-07-17 | End: 2019-05-08

## 2018-07-17 RX ADMIN — METHYLPHENIDATE HYDROCHLORIDE 10 MG: 10 TABLET ORAL at 09:58

## 2018-07-17 RX ADMIN — CITALOPRAM HYDROBROMIDE 20 MG: 20 TABLET ORAL at 09:59

## 2018-07-17 RX ADMIN — LAMOTRIGINE 25 MG: 25 TABLET ORAL at 09:58

## 2018-07-17 RX ADMIN — BUPROPION HYDROCHLORIDE 150 MG: 150 TABLET, FILM COATED, EXTENDED RELEASE ORAL at 09:59

## 2018-07-17 RX ADMIN — Medication 25 MG: at 09:59

## 2018-07-17 NOTE — DISCHARGE SUMMARY
"Psychiatric Discharge Summary    Chanda Gongora MRN# [id number]   Age: 17 year old YOB: 2000     Date of Admission:  7/14/2018  Date of Discharge:  7/17/2018  Admitting Physician:  Jared Leigh MD  Discharge Physician:  Jared Leigh MD         Event Leading to Hospitalization:          See Admission note for additional details.          Diagnoses/Labs/Consults/Hospital Course:     Principal Diagnosis: Depressive disorder, unspecified  Borderline personality features, rule out BPD    Medications: risks and benefits have been discussed with patient.   In this hospitalization, we :  -Restarted Celexa at lower dose 20mg daily.   -Restarted Wellbutrin XL at lower dose 150mg daily  --Restarted Ritalin 10mg daily   -Restarted Lamictal 25 mg daily .     Laboratory/Imaging: CMP WNL, CBC with diff, WNL TSH WNL    Lipid panel shows elevated cholesterol 201, ETW489, Non-. Otherwise normal.  - UDS positive for Cannabis  Consults: None    Secondary psychiatric diagnoses of concern this admission:   Anxiety, unspecified  Cannabis use disorder, mild to moderate  Polysubstance use  History of ADHD      Medical diagnoses to be addressed this admission:  None    Legal Status: voluntary    Safety Assessment:   Checks: Status 15  Precautions: Suicide  Patient did not require seclusion/restraints or  administration of emergency medications to manage behavior.    The risks, benefits, alternatives and side effects were discussed and are understood by the patient and other caregivers.    As patient had not taken meds for one week PTA, we restarted PTA meds at lower than previous dose. Patient was explained again the need to titrate Lamictal slowly, to prevent development of rash.   Also, patient was explained that dose of Celexa should not exceed 40mg this time, due to potential side effect of QTc prolongation. Patient expressed understanding, but her response was \" yeah, yeah, yeah. \" in a hurry.   Writer met " the pt for the first time on 7/16, Monday, at which time, patient denied depressed mood and SI. When asked any specific trigger for depressed mood and SI , she could not identify any trigger in her life.   When asked why she stopped taking meds, she said that she was tired of taking them and did not want to take anymore.   On the unit, she was observed to be intrusive, very talkative, restless. This may be due to undertreated ADHD.   She kept saying she has manic symptoms, but as stated in H& P by another provider, writer did not see evidence of benedicto in history or in clinical observation.   She tolerated all the meds well without issues.   On the day of discharge, she stated that she did not need meds prescribed, as she has all of them at home. As Lamictal 25 mg is lower than 100mg which was the PTA dose, this was pointed out, but she said she has 25 mg at home, as she has been taking 100mg by taking 4 of 25 mg.     Chanda Gongora did participate in groups and was visible in the milieu.  The patient's symptoms of depressed mood , SI improved.   She was able to name several adaptive coping skills and supportive people in her life.      Chanda Gongora was released to home. At the time of discharge, Chanda Gongora was determined to be at her baseline level of danger to herself and others (elevated to some degree given past behaviors).    Care was coordinated with outpatient provider.    Discussed plan with patient on day of  discharge.         Discharge Medications:     Current Discharge Medication List      CONTINUE these medications which have CHANGED    Details   buPROPion (WELLBUTRIN XL) 150 MG 24 hr tablet Take 1 tablet (150 mg) by mouth daily  Qty: 30 tablet, Refills: 0    Associated Diagnoses: Reactive depression      citalopram (CELEXA) 20 MG tablet Take 1 tablet (20 mg) by mouth daily  Qty: 30 tablet, Refills: 0    Associated Diagnoses: Reactive depression      lamoTRIgine (LAMICTAL) 25 MG tablet Take 1  tablet (25 mg) by mouth daily  Qty: 39 tablet, Refills: 0    Comments: Take one tablet a day for another 11 days, then increase dose to 2 tabs ( 50mg) a day for 14 days. Then consult outpatient provider about following dose.  Associated Diagnoses: Reactive depression      methylphenidate (RITALIN) 10 MG tablet Take 1 tablet (10 mg) by mouth daily  Qty: 30 tablet, Refills: 0    Associated Diagnoses: Attention deficit hyperactivity disorder (ADHD), other type         CONTINUE these medications which have NOT CHANGED    Details   Atorvastatin Calcium (LIPITOR PO) Take 25 mg by mouth daily      etonogestrel (IMPLANON/NEXPLANON) 68 MG IMPL 1 each by Subdermal route once                  Psychiatric Examination:   Appearance:  Casual attire, appropriate groom  Attitude:  cooperative  Eye Contact:  fair  Mood:  euthymic  Affect:  full  Speech:  Clear, coherent  Psychomotor Behavior:  No evidence of tardive dyskinesia, no tic, no tremor, gait and station are normal  Thought Process:  linear  Associations:  No loose association  Thought Content:  No SI, No HI, no AH, no VH, no psychotic thoughts  Insight:  limited  Judgment:  limited  Oriented to:  Time, place, person  Attention Span and Concentration:  fair  Recent and Remote Memory:  fair  Language: Able to name objects  Fund of Knowledge: fair  Muscle Strength and Tone: normal  Gait and Station: normal         Discharge Plan:     Health Care Follow-up Appointments:   Medication Management  Date: Thursday, July 19, 2018  Time: 2:30pm      Provider: Dr. Milka Zavaleta  Address: [address] [place] [address] [address] [place]  [address]  Phone:[phone]   [facility] has faxed the Discharge Summary and AVS to this provider at Fax: [phone]       Dual Disorder DBT Intake Appointment   Date: Monday, August 6, 2018  Time: 2pm      Provider: Kassandra Baugh  Address: [employer] [facility]. [address] [place] [address], [address] [place] [address]  Phone: [phone]  [facility] has faxed the Discharge  Summary and AVS to this provider at  Fax: [phone]      Attestation:  Patient has been evaluated by Jared huynh MD  Time: <30 minutes

## 2018-07-17 NOTE — PROGRESS NOTES
07/16/18 2000   Art Therapy   Type of Intervention structured groups   Response participates with cues/redirection   Hours 2   Treatment Detail (Art Therapy)   Problem-Bipolar/ manic  Goal- Art Therapy for coping/ expression  Material Exploration- yolis gomez  Outcome- pt was engaged in art at moments, but mostly up and down a lot, some manic behaviors. She couldn't focus, she was bouncing a ball and kicking a ball during group. Writer let her do this as she just didn't seem like she was able to sit still for group.

## 2018-07-17 NOTE — PLAN OF CARE
Problem: Overarching Goals (Adult)  Goal: Optimized Coping Skills in Response to Life Stressors  Outcome: Therapy, progress toward functional goals as expected  Patient quite social.  Did not participate in first group today.  Remains very animated.  Quite social.  Denies SI/SIB.  Thinking is linear and organized.  Feels ready for discharge, feels safe.  Acknowledged understanding of discharge instructions and follow-up.

## 2018-07-19 NOTE — PROGRESS NOTES
"[facility] [place]   Psychiatric Progress Note      Impression:   This is a 17 year old female admitted for depressed mood and SI.  She had stopped taking medications one week prior to admission. She reported that she was being \" manic\" in therapist's office, but we did not see significant symptoms of benedicto.  We are adjusting medications to target mood.  We are also working with the patient on therapeutic skill building.           Diagnoses and Plan:     Principal Diagnosis: Principal Diagnosis: Depressive disorder, unspecified  Borderline personality features, rule out BPD    Unit: 4A  Attending: Reese  Medications: risks/benefits discussed with patient  We discussed meds today . Pt reported that PTA meds were working well with her. We discussed development of rash by Lamictal and the need to titrate the dose slowly. We discussed limit of Celexa dose and effect on EKG on higher dose.     - Celexa  20mg daily.   -Restart Wellbutrin XL at lower dose 150mg daily  --RestartRitalin 10mg daily   - Lamictal 25 mg daily .      Laboratory/Imaging: CMP WNL, CBC with diff, WNL TSH WNL    Lipid panel shows elevated cholesterol 201, YIU227, Non-. Otherwise normal.  - UDS positive for Cannabis  Consults: None     Secondary psychiatric diagnoses of concern this admission:   Anxiety, unspecified  Cannabis use disorder, mild to moderate  Polysubstance use  History of ADHD        Medical diagnoses to be addressed this admission:  None     Legal Status: voluntary     Safety Assessment:   Checks: Status 15  Precautions: Suicide  Pt has not required locked seclusion or restraints in the past 24 hours to maintain safety, please refer to RN documentation for further details.    The risks, benefits, alternatives and side effects have been discussed and are understood by the patient and other caregivers.     Target symptoms to stabilize: depressed mood, SI  Target disposition: outpatient " "provider    Attestation:  Patient has been seen and evaluated by me,  Jared Leigh MD          Interim History:   The patient's care was discussed with the treatment team and chart notes were reviewed.    Pt denies depressed mood and SI. She stated that she stopped taking meds because she was tired of taking meds.   She denies significant stressor in her life that led to depressed mood and SI. But she also says that she was being ' manic\" in therapist's office. She says that her SI comes rather quickly and improves rather quickly.     Side effects to medication: denies  Sleep: slept through the night  Intake:drinks and eats without difficulty   Groups: attending  Peer interactions: gets along well with peers        The 10 point Review of Systems is negative other than noted in the HPI         Medications:             Allergies:   No Known Allergies         Psychiatric Examination:   /61 (BP Location: Right arm)  Pulse 87  Temp 96.7  F (35.9  C) (Tympanic)  Resp 16  Ht 1.6 m (5' 3\")  Wt 51.3 kg (113 lb 1.5 oz)  SpO2 95%  BMI 20.03 kg/m2  Weight is 113 lbs 1.54 oz  Body mass index is 20.03 kg/(m^2).    Appearance:  Casual attire, appropriate groom  Attitude:  cooperative  Eye Contact:  fair  Mood:  euthymic  Affect:  full  Speech:  Clear, coherent  Psychomotor Behavior:  No evidence of tardive dyskinesia, no tic, no tremor, gait and station are normal  Thought Process:  linear  Associations:  No loose association  Thought Content:  No SI, No HI, no AH, no VH, no psychotic thoughts  Insight:  limited  Judgment:  limited  Oriented to:  Time, place, person  Attention Span and Concentration:  fair  Recent and Remote Memory:  fair  Language: Able to name objects  Fund of Knowledge: fair  Muscle Strength and Tone: normal  Gait and Station: normal         Labs:   No results found for this or any previous visit (from the past 24 hour(s)).  "

## 2018-07-23 ENCOUNTER — TELEPHONE (OUTPATIENT)
Dept: PHARMACY | Facility: CLINIC | Age: 18
End: 2018-07-23

## 2018-07-23 NOTE — TELEPHONE ENCOUNTER
Called patient for scheduled transitions of care medication therapy management (MTM) visit to review medications after resent hospital stay.  Called first at 3 PM. No answer. Left voicemail stating I would attempt call back in 5-10 minutes.  Called second at 3:10 PM. No answer. Left second voicemail with MTM Scheduling Line for her to reschedule.     Tony Boyce, Juan CarlosD, [facility] HospitalCP  Medication Therapy Management Pharmacist  Pager: [phone]

## 2019-05-08 ENCOUNTER — HOSPITAL ENCOUNTER (EMERGENCY)
Facility: CLINIC | Age: 19
Discharge: HOME | End: 2019-05-08
Attending: EMERGENCY MEDICINE | Admitting: EMERGENCY MEDICINE
Payer: COMMERCIAL

## 2019-05-08 ENCOUNTER — HOSPITAL ENCOUNTER (INPATIENT)
Facility: CLINIC | Age: 19
End: 2019-05-08
Attending: PSYCHIATRY & NEUROLOGY | Admitting: PSYCHIATRY & NEUROLOGY
Payer: COMMERCIAL

## 2019-05-08 VITALS
SYSTOLIC BLOOD PRESSURE: 104 MMHG | RESPIRATION RATE: 16 BRPM | TEMPERATURE: 100 F | BODY MASS INDEX: 19.49 KG/M2 | WEIGHT: 110 LBS | OXYGEN SATURATION: 99 % | DIASTOLIC BLOOD PRESSURE: 69 MMHG | HEART RATE: 74 BPM

## 2019-05-08 DIAGNOSIS — F32.A DEPRESSION, UNSPECIFIED DEPRESSION TYPE: ICD-10-CM

## 2019-05-08 DIAGNOSIS — R45.851 SUICIDAL IDEATION: ICD-10-CM

## 2019-05-08 DIAGNOSIS — F31.30 BIPOLAR AFFECTIVE DISORDER, CURRENT EPISODE DEPRESSED, CURRENT EPISODE SEVERITY UNSPECIFIED (H): ICD-10-CM

## 2019-05-08 PROCEDURE — 90791 PSYCH DIAGNOSTIC EVALUATION: CPT

## 2019-05-08 PROCEDURE — 99285 EMERGENCY DEPT VISIT HI MDM: CPT | Mod: 25

## 2019-05-08 RX ORDER — DOXYCYCLINE 100 MG/1
100 CAPSULE ORAL DAILY
Refills: 3 | COMMUNITY
Start: 2019-04-08

## 2019-05-08 RX ORDER — ATORVASTATIN CALCIUM 20 MG/1
20 TABLET, FILM COATED ORAL AT BEDTIME
COMMUNITY

## 2019-05-08 RX ORDER — OLANZAPINE 5 MG/1
5 TABLET ORAL 2 TIMES DAILY PRN
COMMUNITY
Start: 2018-12-15

## 2019-05-08 RX ORDER — GABAPENTIN 100 MG/1
100 CAPSULE ORAL 3 TIMES DAILY PRN
Refills: 2 | COMMUNITY
Start: 2019-03-12

## 2019-05-08 RX ORDER — LAMOTRIGINE 200 MG/1
200 TABLET ORAL AT BEDTIME
COMMUNITY

## 2019-05-08 RX ORDER — ADAPALENE 0.1 G/100G
CREAM TOPICAL AT BEDTIME
Refills: 11 | COMMUNITY
Start: 2019-02-12

## 2019-05-08 RX ORDER — ARIPIPRAZOLE 20 MG/1
20 TABLET ORAL DAILY
COMMUNITY
Start: 2019-04-18 | End: 2019-05-08

## 2019-05-08 RX ORDER — PROPRANOLOL HYDROCHLORIDE 10 MG/1
10 TABLET ORAL 2 TIMES DAILY
Refills: 2 | COMMUNITY
Start: 2019-01-25 | End: 2019-05-08

## 2019-05-08 RX ORDER — HYDROXYZINE HYDROCHLORIDE 25 MG/1
25 TABLET, FILM COATED ORAL 2 TIMES DAILY PRN
Refills: 2 | COMMUNITY
Start: 2019-03-12

## 2019-05-08 SDOH — HEALTH STABILITY: MENTAL HEALTH: HOW OFTEN DO YOU HAVE A DRINK CONTAINING ALCOHOL?: NEVER

## 2019-05-08 ASSESSMENT — ENCOUNTER SYMPTOMS: DYSPHORIC MOOD: 1

## 2019-05-08 NOTE — ED PROVIDER NOTES
"  History     Chief Complaint:  Suicidal Ideation    HPI   Chanda Gongora is a 18 year old female with a history of suicidal attempt who presents to the emergency department for evaluation of suicidal ideaiton. The patient reports she has struggled with depression and suicidal ideation for some time now, and it has worsened over the past couple of weeks without any particular stressor or trigger. She had a therapist appointment today at [facility] where she made suicidal statements, talking about driving her car into oncoming traffic. The patient has chronic suicidal ideations and her therapist reportedly gave her the option of contacting her parents for close support and observation at home versus transport to the ED for further evaluation. The patient reports she didn't want to \"bother my mom\" and so she chose to come to the ED. The patient states she has been hospitalized in the past for suicide attempt, most recently in November of 2018, with overdose. She denies any self harm today, but indicates she has thoughts about overdose again or  her car into traffic, as mentioned above. The patient notes she has not taken her Abilify for around 2 weeks at this time, and she feels her mental health has worsened since stopping it. She lives with parents. She also endorses visual hallucinations and paranoia for the past 2-3 days, stating that she feels \"like someone is watching me\" and that she sometimes sees other people who she knows are not there. She also had previous hallucinations in September of 2018. She denies HI. She denies any physical health concerns.     Allergies:  NKDA     Medications:    Atorvastatin  Wellbutrin  Celexa  Etonogestrel  Lamictal  Ritalin     Past Medical History:    ADHD  HLD  Suicidal ideation    Past Surgical History:    The patient does not have any pertinent past surgical history.    Family History:    No past pertinent family history.    Social History:  Presents alone.  Never " "smoker.  Positive for alcohol use.   Marital Status:  Single [1]     Review of Systems   Psychiatric/Behavioral: Positive for dysphoric mood and suicidal ideas. Negative for self-injury.   All other systems reviewed and are negative.      Physical Exam     Patient Vitals for the past 24 hrs:   BP Temp Temp src Heart Rate Resp SpO2 Weight   05/08/19 1310 136/74 100  F (37.8  C) Oral 89 14 100 % 49.9 kg (110 lb)     Physical Exam  General: Well appearing, nontoxic. Resting comfortably  Head:  Scalp, face, and head appear normal  Eyes:  Pupils are equal, round    Conjunctivae non-injected and sclerae white  ENT:    The external nose is normal    Pinnae are normal  Neck:  Normal range of motion    There is no rigidity noted    Trachea is in the midline  CV:  Regular rate and rhythm     Normal S1/S2, no S3/S4    No murmur or rub  Resp:  Lungs are clear and equal bilaterally    There is no tachypnea    No increased work of breathing    No rales, wheezing, or rhonchi  GI:  Abdomen is soft, no rigidity or guarding    No distension, or mass    No tenderness or rebound tenderness   MS:  Normal muscular tone    Symmetric motor strength    No lower extremity edema  Skin:  Warm and dry  Neuro: Awake and alert    Speech is normal and fluent    Moves all extremities spontaneously  Psych:  Flat affect.  Appropriate interactions. + chronic SI with thoughts about overdose or driving car into traffic. Patient notes that she has no desire to act on these thoughts currently in the ED. She reports she has finals at school this week and \"there are things I need to get done.\" She states \"inpatient treatment really isn't the best for me right now.\" \"I don't feel like I need to stay in the hospital.\" She denies HI. No acute psychomotor agitation or evidence of significant paranoia or delusions/hallucinations.       Emergency Department Course     Emergency Department Course:  Nursing notes and vitals reviewed. 1355 I performed an exam of the " patient as documented above.     1600 I spoke with DEC following her evaluation of the patient. She indicates the patient should be admitted to mental health.    1813 I rechecked and updated the patient. The patient is requesting discharge home and indicating she will contract for safety.    Findings and plan explained to the Patient. Patient discharged home with instructions regarding supportive care, medications, and reasons to return. The importance of close follow-up was reviewed.     I personally reviewed the laboratory results with the Patient and answered all related questions prior to sign out    Impression & Plan      Medical Decision Making:  Chanda Gongora is a 18 year old female who presents from her therapist office for increased suicidal thoughts. She initially reported to her therapist that she had a plan to possibly overdose or drive into traffic. She reports that she frequently has these thoughts and does have a prior attempt to suicide by overdose. She reports that she has ongoing depression and thoughts of paranoia that people are watching her from her phone. She has a good relationship set up with her therapist. Her therapist had initially offered her to go home with safety plan under the watchful eye of the parents, given that she lives with her parents. However, the patient had initially declined this and therefore was brought to the ED. On my evaluation, she is well-appearing, she has no psychomotor agitation or evidence for darshana psychosis. The patient was monitored in the ED. At this time, I feel that she needs no other further emergent testing. She denies any physical health concerns. The patient was initially evaluated by Gloria with DEC, who recommended inpatient psychiatric admission. However, after several hours in the ED, the patient was reevaluated by myself and states that she is not actually having any thoughts about harming herself and does not feel that inpatient admission would be  "beneficial for her. She reports forward thinking and future planning, discussing her concerns about missing school. She reports that she feels that she could sign a safety contract and promises that she is not feeling suicidal at all. I discussed this with DEC, who went over a safety plan with the patient and also discussed this with the patient's mother who reports \"she does this all the time.\" The patient's mother felt very comfortable with the patient going home and notes that her mother and father would watch her closely to make sure she stays safe. I would be comfortable with patient going home with safety plan in place with very close follow up with her therapist tomorrow, which the patient states she already has scheduled, and continuous watchful presence of her parents. The patient agrees to a safety contract and notes that she \"promises I'll be safe.\" At this time I do not feel the patient can be held further involuntarily against her will and that she is reasonably safe for discharge with close outpatient follow up. Return precautions were discussed with patient. The patient's questions were answered and the patient was agreeable with discharge.       Diagnosis:    ICD-10-CM   1. Suicidal ideation R45.851   2. Depression, unspecified depression type F32.9       Disposition:  Signed out to Dr. Desir pending parents' arrival and subsequent discharge.    I, Samm Arshad, am serving as a scribe on 5/8/2019 at 12:33 PM to personally document services performed by Kelvin Diaz MD based on my observations and the provider's statements to me.     Samm Arshad  5/8/2019    EMERGENCY DEPARTMENT       Kelvin Diaz MD  05/09/19 1038    "

## 2019-05-08 NOTE — ED AVS SNAPSHOT
Emergency Department  [address] [place] [address]  Phone:  [phone]  Fax:  [phone]                                    Chanda Gongora   MRN: [id number]    Department:   Emergency Department   Date of Visit:  5/8/2019           After Visit Summary Signature Page    I have received my discharge instructions, and my questions have been answered. I have discussed any challenges I see with this plan with the nurse or doctor.    ..........................................................................................................................................  Patient/Patient Representative Signature      ..........................................................................................................................................  Patient Representative Print Name and Relationship to Patient    ..................................................               ................................................  Date                                   Time    ..........................................................................................................................................  Reviewed by Signature/Title    ...................................................              ..............................................  Date                                               Time          22EPIC Rev 08/18

## 2019-05-08 NOTE — DISCHARGE INSTRUCTIONS
Discharge Instructions  Mental Health Concerns    You were seen today for mental health concerns, such as depression, anxiety, or suicidal thinking. Your provider feels that you do not require hospitalization at this time. However, your symptoms may become worse, and you may need to return to the Emergency Department. Most treatments of depression and suicidal thoughts are a process rather than a single intervention.  Medications and counseling can take several weeks or more to help.    Generally, every Emergency Department visit should have a follow-up clinic visit with either a primary or a specialty clinic/provider. Please follow-up as instructed by your emergency provider today.    By accepting these discharge instructions:  You promise to not harm yourself or others.  You agree that if you feel you are becoming unable to keep that promise, you will do something to help yourself before you do anything to harm yourself or others.   You agree to keep any safety plan arranged on your visit here today.  You agree to take any medication prescribed or recommended by your provider.  If you are getting worse, you can contact a friend or a family member, contact your counselor or family provider, contact a crisis line, or other options discussed with the provider or therapist today.  At any time, you can call 911 and return to the Emergency Department for more help.  You understand that follow-up is essential to your treatment, and you will make and keep appointments recommended on your visit today.    How to improve your mental health and prevent suicide:  Involve others by letting family, friends, counselors know.  Do not isolate yourself.  Avoid alcohol or drugs. Remove weapons, poisons from your home.  Try to stick to routines for eating, sleeping and getting regular exercise.    Try to get into sunlight. Bright natural light not only treats seasonal affective disorder but also depression.  Increase safe activities  that you enjoy.    If you feel worse, contact 1-800-suicide (1-840.585.1385), or call 911, or your primary provider/counselor for additional assistance.    If you were given a prescription for medicine here today, be sure to read all of the information (including the package insert) that comes with your prescription.  This will include important information about the medicine, its side effects, and any warnings that you need to know about.  The pharmacist who fills the prescription can provide more information and answer questions you may have about the medicine.  If you have questions or concerns that the pharmacist cannot address, please call or return to the Emergency Department.   Remember that you can always come back to the Emergency Department if you are not able to see your regular provider in the amount of time listed above, if you get any new symptoms, or if there is anything that worries you.

## 2019-05-08 NOTE — PHARMACY-ADMISSION MEDICATION HISTORY
Admission medication history interview status for the 5/8/2019  admission is complete. See EPIC admission navigator for prior to admission medications     Medication history source reliability:Good. List per chart review and pt report.     Removed: wellbutrin, propranolol, abilify, celexa - pt reports no longer taking.  Plan is to start Latuda but pt has not filled Rx yet.     Time spent in this activity: 30 min    Prior to Admission medications    Medication Sig Last Dose Taking? Auth Provider   adapalene (DIFFERIN) 0.1 % external cream Apply topically At Bedtime 5/7/2019 at Unknown time Yes Unknown, Entered By History   atorvastatin (LIPITOR) 20 MG tablet Take 20 mg by mouth At Bedtime 5/7/2019 at hs Yes Unknown, Entered By History   cholecalciferol ( ULTRA STRENGTH) 2000 units CAPS Take 2,000 Units by mouth daily 5/7/2019 at Unknown time Yes Unknown, Entered By History   doxycycline monohydrate (MONODOX) 100 MG capsule Take 100 mg by mouth daily 5/7/2019 at Unknown time Yes Unknown, Entered By History   etonogestrel (IMPLANON/NEXPLANON) 68 MG IMPL 1 each by Subdermal route once  in place Yes Reported, Patient   gabapentin (NEURONTIN) 100 MG capsule Take 100 mg by mouth 3 times daily as needed PRN Yes Unknown, Entered By History   hydrOXYzine (ATARAX) 25 MG tablet Take 25 mg by mouth 2 times daily as needed PRN Yes Unknown, Entered By History   lamoTRIgine (LAMICTAL) 200 MG tablet Take 200 mg by mouth At Bedtime 5/7/2019 at hs Yes Unknown, Entered By History   OLANZapine (ZYPREXA) 5 MG tablet Take 5 mg by mouth 2 times daily as needed   Unknown, Entered By History

## 2019-05-08 NOTE — ED TRIAGE NOTES
"Pt brought to ED by therapist from [facility].  Patient had a routine scheduled appointment with her therapist where she stated that her suicidal thoughts were increasing today and she found herself inching her car farther and farther into traffic at intersections.  Patient was unable to contract for safety with her therapist and was brought voluntarily to the ED.  Patient endorses suicidal thoughts and states \"I would take all my pills\".  Pt claims she has been taking all her prescribed medications except her Abilify.  Patient states \"they are watching me through my phone\".  Pt unable to elicit who is watching her or why, \"I don't know yet\".   Pt states that her mother is aware that she was coming to the hospital.   "

## 2019-05-08 NOTE — ED NOTES
Video Observation initiated, patient informed.     Pt declined to remove her clothing and change into scrubs.  Patient gave permission for security personnel to wand her which was completed.      Justin Pavon RN

## 2020-12-21 NOTE — PROGRESS NOTES
Pt denies SI/SIB. Pt requires redirection multiple times a shift for inappropriate conversation topics- especially around previous admissions and asking personal questions of peers. Pt visible and social in milieu. Pt attending groups. Pt vocal about complaints of staff. Pt over-talks peers. Pt expresses no concerns during the check in.     07/16/18 2200   Behavioral Health   Hallucinations denies / not responding to hallucinations   Thinking distractable   Orientation person: oriented;place: oriented;date: oriented;time: oriented   Memory baseline memory   Insight admits / accepts   Judgement impaired   Eye Contact at examiner   Affect full range affect;incongruent   Mood mood is calm   Physical Appearance/Attire attire appropriate to age and situation   Hygiene well groomed   Suicidality other (see comments)  (denies)   1. Wish to be Dead No   2. Non-Specific Active Suicidal Thoughts  No   Elopement (no behavioral concerns)   Activity other (see comment);restless  (visibl, social)   Speech coherent;clear  (fast)   Medication Sensitivity no stated side effects;no observed side effects   Psychomotor / Gait balanced;steady      n/a